# Patient Record
Sex: FEMALE | Race: WHITE | NOT HISPANIC OR LATINO | URBAN - METROPOLITAN AREA
[De-identification: names, ages, dates, MRNs, and addresses within clinical notes are randomized per-mention and may not be internally consistent; named-entity substitution may affect disease eponyms.]

---

## 2023-08-21 ENCOUNTER — OFFICE VISIT (OUTPATIENT)
Dept: URBAN - METROPOLITAN AREA CLINIC 40 | Facility: CLINIC | Age: 28
End: 2023-08-21
Payer: COMMERCIAL

## 2023-08-21 ENCOUNTER — LAB OUTSIDE AN ENCOUNTER (OUTPATIENT)
Dept: URBAN - METROPOLITAN AREA CLINIC 40 | Facility: CLINIC | Age: 28
End: 2023-08-21

## 2023-08-21 ENCOUNTER — WEB ENCOUNTER (OUTPATIENT)
Dept: URBAN - METROPOLITAN AREA CLINIC 40 | Facility: CLINIC | Age: 28
End: 2023-08-21

## 2023-08-21 VITALS
BODY MASS INDEX: 28.71 KG/M2 | DIASTOLIC BLOOD PRESSURE: 84 MMHG | SYSTOLIC BLOOD PRESSURE: 130 MMHG | HEIGHT: 62 IN | HEART RATE: 104 BPM | WEIGHT: 156 LBS

## 2023-08-21 DIAGNOSIS — R11.10 REGURGITATION: ICD-10-CM

## 2023-08-21 DIAGNOSIS — R10.13 DYSPEPSIA: ICD-10-CM

## 2023-08-21 PROCEDURE — 99203 OFFICE O/P NEW LOW 30 MIN: CPT | Performed by: INTERNAL MEDICINE

## 2023-08-21 RX ORDER — PANTOPRAZOLE SODIUM 40 MG/1
1 TABLET TABLET, DELAYED RELEASE ORAL ONCE A DAY
Qty: 90 | Refills: 0 | OUTPATIENT
Start: 2023-08-21

## 2023-08-21 NOTE — HPI-TODAY'S VISIT:
Ms. Caceres is a 28-year-old white female presenting for new patient evaluation of belching and regurgitation.  Patient states for the last 3 years anytime she eats foods like beef or sausage she gets what she calls sulfur burps.  This is accompanied by nausea and regurgitation.  She has acid reflux and regurgitates acid 2-3 times a week.  She is try Pepto-Bismol.  She is not been on any prescription medications.  She denies any teo dysphagia.  She does admit to taking ibuprofen frequently.  She states she was taking it 3-4 times daily.  Now she is taking it 1-2 times a week.  She admits to dietary indiscretions with caffeine, tomatoes, onions and spicy food.

## 2023-09-01 ENCOUNTER — TELEPHONE ENCOUNTER (OUTPATIENT)
Dept: URBAN - METROPOLITAN AREA CLINIC 39 | Facility: CLINIC | Age: 28
End: 2023-09-01

## 2023-09-11 ENCOUNTER — OFFICE VISIT (OUTPATIENT)
Dept: URBAN - METROPOLITAN AREA SURGERY CENTER 30 | Facility: SURGERY CENTER | Age: 28
End: 2023-09-11

## 2023-10-11 ENCOUNTER — DASHBOARD ENCOUNTERS (OUTPATIENT)
Age: 28
End: 2023-10-11

## 2023-10-11 ENCOUNTER — OFFICE VISIT (OUTPATIENT)
Dept: URBAN - METROPOLITAN AREA CLINIC 40 | Facility: CLINIC | Age: 28
End: 2023-10-11
Payer: COMMERCIAL

## 2023-10-11 VITALS
HEART RATE: 93 BPM | BODY MASS INDEX: 28.52 KG/M2 | SYSTOLIC BLOOD PRESSURE: 116 MMHG | HEIGHT: 62 IN | DIASTOLIC BLOOD PRESSURE: 74 MMHG | WEIGHT: 155 LBS | TEMPERATURE: 98.2 F

## 2023-10-11 DIAGNOSIS — R11.10 REGURGITATION: ICD-10-CM

## 2023-10-11 DIAGNOSIS — R10.13 DYSPEPSIA: ICD-10-CM

## 2023-10-11 PROCEDURE — 99214 OFFICE O/P EST MOD 30 MIN: CPT | Performed by: PHYSICIAN ASSISTANT

## 2023-10-11 RX ORDER — PANTOPRAZOLE SODIUM 40 MG/1
1 TABLET TABLET, DELAYED RELEASE ORAL ONCE A DAY
Qty: 90 TABLET | Refills: 1
Start: 2023-08-21

## 2023-10-11 RX ORDER — PANTOPRAZOLE SODIUM 40 MG/1
1 TABLET TABLET, DELAYED RELEASE ORAL ONCE A DAY
Qty: 90 | Refills: 0 | Status: ACTIVE | COMMUNITY
Start: 2023-08-21

## 2023-10-11 NOTE — HPI-TODAY'S VISIT:
Ms. Caceres is a 28-year-old white female seen for initial visit 8/21/23, for evaluation of belching and regurgitation.  Patient states for the last 3 years anytime she eats foods like beef or sausage she gets what she calls sulfur burps.  This is accompanied by nausea and regurgitation.  She has acid reflux and regurgitates acid 2-3 times a week.  She tried Pepto-Bismol.  She is not been on any prescription medications.  She denies any teo dysphagia.  She was taking ibuprofen frequently.  She states she was taking it 3-4 times daily.  Now she is taking it 1-2 times a week.  She admits to dietary indiscretions with caffeine, tomatoes, onions and spicy food. Insurance denied EGD as no recent longterm use of PPI prior to endoscopy. However, patient admits the ppi has helped significantly. She went back to eating red/hamburger meat, drinks sodas daily, little water. Former use of cigarettes, now vaping. She plans to d/c vaping in the future. She admits pantoprazole keeps symptoms under control despite diet.

## 2024-01-11 ENCOUNTER — OFFICE VISIT (OUTPATIENT)
Dept: URBAN - METROPOLITAN AREA CLINIC 40 | Facility: CLINIC | Age: 29
End: 2024-01-11